# Patient Record
Sex: MALE
[De-identification: names, ages, dates, MRNs, and addresses within clinical notes are randomized per-mention and may not be internally consistent; named-entity substitution may affect disease eponyms.]

---

## 2020-05-26 PROBLEM — Z00.00 ENCOUNTER FOR PREVENTIVE HEALTH EXAMINATION: Status: ACTIVE | Noted: 2020-05-26

## 2020-05-27 ENCOUNTER — APPOINTMENT (OUTPATIENT)
Dept: OTOLARYNGOLOGY | Facility: CLINIC | Age: 70
End: 2020-05-27
Payer: COMMERCIAL

## 2020-06-03 ENCOUNTER — APPOINTMENT (OUTPATIENT)
Dept: OTOLARYNGOLOGY | Facility: CLINIC | Age: 70
End: 2020-06-03
Payer: COMMERCIAL

## 2020-06-03 VITALS
RESPIRATION RATE: 17 BRPM | TEMPERATURE: 98.4 F | DIASTOLIC BLOOD PRESSURE: 80 MMHG | OXYGEN SATURATION: 95 % | SYSTOLIC BLOOD PRESSURE: 165 MMHG | HEART RATE: 108 BPM

## 2020-06-03 DIAGNOSIS — J34.2 DEVIATED NASAL SEPTUM: ICD-10-CM

## 2020-06-03 DIAGNOSIS — E04.9 NONTOXIC GOITER, UNSPECIFIED: ICD-10-CM

## 2020-06-03 DIAGNOSIS — J32.8 OTHER CHRONIC SINUSITIS: ICD-10-CM

## 2020-06-03 DIAGNOSIS — T48.5X5A CHRONIC RHINITIS: ICD-10-CM

## 2020-06-03 DIAGNOSIS — R04.0 EPISTAXIS: ICD-10-CM

## 2020-06-03 DIAGNOSIS — G47.33 OBSTRUCTIVE SLEEP APNEA (ADULT) (PEDIATRIC): ICD-10-CM

## 2020-06-03 DIAGNOSIS — J31.0 CHRONIC RHINITIS: ICD-10-CM

## 2020-06-03 PROCEDURE — 99204 OFFICE O/P NEW MOD 45 MIN: CPT | Mod: 25

## 2020-06-03 PROCEDURE — 31575 DIAGNOSTIC LARYNGOSCOPY: CPT

## 2020-06-03 RX ORDER — FLUTICASONE PROPIONATE 50 UG/1
50 SPRAY, METERED NASAL DAILY
Qty: 1 | Refills: 6 | Status: ACTIVE | COMMUNITY
Start: 2020-06-03 | End: 1900-01-01

## 2020-06-03 NOTE — REVIEW OF SYSTEMS
[As Noted in HPI] : as noted in HPI [Nasal Congestion] : nasal congestion [Nose Bleeds] : nose bleeds [Negative] : Heme/Lymph

## 2020-06-09 NOTE — HISTORY OF PRESENT ILLNESS
[de-identified] : 70 years old male patient with history of Chronic nasal congestion and sinusitis for the past 6 months.   Patient is present today in the office with Deviated Nasal Septum.  Turbinate Hypertrophy.  Left Nasal Epistaxis.  Rhinitis Medicamentosa. Enlarged Thyroid gland.   Obstructive Sleep Apnea.  C/O Snoring loud enough to disturb his sleep or that of others.  Waking up gasping or choking.  Intermittent pauses in his breathing during sleep\par

## 2020-06-09 NOTE — PROCEDURE
[Congested] : congested [Deviated to the Lt] : deviated to the left [Nasal Septum Mucosa Bleeding] : no bleeding [Nasal Septum Mucosa Bleeding Left] : bleeding on the left [Image(s) Captured] : image(s) captured and filed [Topical Lidocaine] : topical lidocaine [Flexible Endoscope] : examined with the flexible endoscope [Serial Number: ___] : Serial Number: [unfilled] [de-identified] :  Deviated Nasal Septum.  Turbinate Hypertrophy.  Left Nasal Epistaxis.  Rhinitis Medicamentosa.

## 2020-06-09 NOTE — REASON FOR VISIT
[Initial Consultation] : an initial consultation for [FreeTextEntry2] : Chronic nasal congestion and sinusitis for the past 6 months.  Patient states his level of severity is a level 10 out of 10 and it occurs constant.  Patient states nothing helps to improve or worsens his Chronic nasal congestion and sinusitis for the past 6 months.

## 2020-06-09 NOTE — CONSULT LETTER
[Dear  ___] : Dear  [unfilled], [Consult Letter:] : I had the pleasure of evaluating your patient, [unfilled]. [Please see my note below.] : Please see my note below. [Consult Closing:] : Thank you very much for allowing me to participate in the care of this patient.  If you have any questions, please do not hesitate to contact me. [Sincerely,] : Sincerely, [DrEstevan  ___] : Dr. DOMINGUEZ [FreeTextEntry3] : Barry Roe MD, FACS\par Professor of Otolaryngology, [employer] at [facility]\par Director, [facility], [facility]\par , [facility], [facility]\par

## 2020-06-09 NOTE — PHYSICAL EXAM
[Normal] : no rashes [FreeTextEntry1] : RADHA findings and Morbid Obesity [de-identified] :  Deviated Nasal Septum.  Turbinate Hypertrophy.  Left Nasal Epistaxis.  Rhinitis